# Patient Record
Sex: FEMALE | Race: WHITE
[De-identification: names, ages, dates, MRNs, and addresses within clinical notes are randomized per-mention and may not be internally consistent; named-entity substitution may affect disease eponyms.]

---

## 2018-02-19 ENCOUNTER — HOSPITAL ENCOUNTER (EMERGENCY)
Dept: HOSPITAL 92 - SCSER | Age: 38
Discharge: HOME | End: 2018-02-19
Payer: COMMERCIAL

## 2018-02-19 DIAGNOSIS — J20.9: Primary | ICD-10-CM

## 2018-02-19 DIAGNOSIS — F41.9: ICD-10-CM

## 2018-02-19 DIAGNOSIS — F43.10: ICD-10-CM

## 2018-02-19 DIAGNOSIS — I10: ICD-10-CM

## 2018-02-19 DIAGNOSIS — Z79.899: ICD-10-CM

## 2018-02-19 DIAGNOSIS — F31.9: ICD-10-CM

## 2018-02-19 PROCEDURE — 99283 EMERGENCY DEPT VISIT LOW MDM: CPT

## 2019-02-17 ENCOUNTER — HOSPITAL ENCOUNTER (EMERGENCY)
Dept: HOSPITAL 92 - SCSER | Age: 39
Discharge: HOME | End: 2019-02-17
Payer: SELF-PAY

## 2019-02-17 DIAGNOSIS — Z71.6: ICD-10-CM

## 2019-02-17 DIAGNOSIS — S93.602A: Primary | ICD-10-CM

## 2019-02-17 DIAGNOSIS — Z79.899: ICD-10-CM

## 2019-02-17 DIAGNOSIS — I10: ICD-10-CM

## 2019-02-17 DIAGNOSIS — F43.10: ICD-10-CM

## 2019-02-17 DIAGNOSIS — F31.9: ICD-10-CM
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
## 2019-02-17 DIAGNOSIS — F41.9: ICD-10-CM

## 2019-02-17 DIAGNOSIS — F17.210: ICD-10-CM

## 2019-02-17 PROCEDURE — 99406 BEHAV CHNG SMOKING 3-10 MIN: CPT

## 2019-02-17 NOTE — RAD
LEFT FOOT THREE VIEWS:

 

HISTORY:

Injury.  Left foot pain.

 

FINDINGS:

No acute fractures or dislocations are identified.

 

POS: [facility]

## 2019-07-25 ENCOUNTER — HOSPITAL ENCOUNTER (EMERGENCY)
Dept: HOSPITAL 92 - SCSER | Age: 39
Discharge: HOME | End: 2019-07-25
Payer: SELF-PAY

## 2019-07-25 DIAGNOSIS — F32.81: ICD-10-CM

## 2019-07-25 DIAGNOSIS — I10: ICD-10-CM

## 2019-07-25 DIAGNOSIS — F43.10: ICD-10-CM

## 2019-07-25 DIAGNOSIS — F17.290: ICD-10-CM

## 2019-07-25 DIAGNOSIS — F31.9: ICD-10-CM

## 2019-07-25 DIAGNOSIS — F17.210: ICD-10-CM

## 2019-07-25 DIAGNOSIS — F41.9: ICD-10-CM

## 2019-07-25 DIAGNOSIS — R42: Primary | ICD-10-CM

## 2019-07-25 PROCEDURE — 99281 EMR DPT VST MAYX REQ PHY/QHP: CPT

## 2020-04-19 ENCOUNTER — HOSPITAL ENCOUNTER (EMERGENCY)
Dept: HOSPITAL 92 - ERS | Age: 40
Discharge: HOME | End: 2020-04-19
Payer: SELF-PAY

## 2020-04-19 DIAGNOSIS — I10: ICD-10-CM

## 2020-04-19 DIAGNOSIS — F17.290: ICD-10-CM

## 2020-04-19 DIAGNOSIS — R53.1: Primary | ICD-10-CM

## 2020-04-19 DIAGNOSIS — F31.9: ICD-10-CM

## 2020-04-19 DIAGNOSIS — F41.9: ICD-10-CM

## 2020-04-19 DIAGNOSIS — F43.10: ICD-10-CM

## 2020-04-19 DIAGNOSIS — Z79.899: ICD-10-CM

## 2020-04-19 DIAGNOSIS — K58.9: ICD-10-CM

## 2020-04-19 PROCEDURE — 99281 EMR DPT VST MAYX REQ PHY/QHP: CPT
